# Patient Record
Sex: FEMALE | Race: WHITE
[De-identification: names, ages, dates, MRNs, and addresses within clinical notes are randomized per-mention and may not be internally consistent; named-entity substitution may affect disease eponyms.]

---

## 2017-04-15 ENCOUNTER — HOSPITAL ENCOUNTER (EMERGENCY)
Dept: HOSPITAL 25 - UCCORT | Age: 22
Discharge: HOME | End: 2017-04-15
Payer: COMMERCIAL

## 2017-04-15 VITALS — SYSTOLIC BLOOD PRESSURE: 105 MMHG | DIASTOLIC BLOOD PRESSURE: 68 MMHG

## 2017-04-15 DIAGNOSIS — S63.601A: Primary | ICD-10-CM

## 2017-04-15 DIAGNOSIS — Z32.02: ICD-10-CM

## 2017-04-15 DIAGNOSIS — Z87.891: ICD-10-CM
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
## 2017-04-15 PROCEDURE — G0463 HOSPITAL OUTPT CLINIC VISIT: HCPCS

## 2017-04-15 PROCEDURE — 99213 OFFICE O/P EST LOW 20 MIN: CPT

## 2017-04-15 PROCEDURE — 84702 CHORIONIC GONADOTROPIN TEST: CPT

## 2017-04-15 NOTE — RAD
HISTORY: Right thumb pain, trauma



COMPARISONS: None



VIEWS: 3, Frontal, lateral, and oblique views of the first digit of the right hand



FINDINGS:



BONE DENSITY: Normal.

BONES: There is no displaced fracture.    

JOINTS: There is no arthropathy.    

ALIGNMENT: There is no dislocation. 

SOFT TISSUES: Unremarkable.



OTHER FINDINGS: None.



IMPRESSION: 

NO ACUTE OSSEOUS INJURY. IF SYMPTOMS PERSIST, RECOMMEND REPEAT IMAGING.

## 2017-04-15 NOTE — UC
Hand/Wrist HPI





- HPI Summary


HPI Summary: 





right thumb pain x 1 day


s/p fall yesterday , jammed her right thumb 


pain and swelling of the right thumb 


difficulty moving her right thumb 





- History Of Current Complaint


Chief Complaint: UCUpperExtremity


Stated Complaint: RIGHT THUMB PAIN


Time Seen by Provider: 04/15/17 09:17


Hx Obtained From: Patient


Hx Last Menstrual Period: "I don't know 'cause I went from the Depo to the arm 

thing..."


Pregnant?: No


Onset/Duration: Sudden Onset, Lasting Days - 1, Still Present


Severity Initially: Moderate


Severity Currently: Moderate


Character Of Pain: Aching, Throbbing


Aggravating Factor(s): Movement, Flexion, Extension


Alleviating: Rest, Ice


Associated Signs And Symptoms: Positive: Swelling, Weakness.  Negative: Numbness

/Tingling





- Allergies/Home Medications


Allergies/Adverse Reactions: 


 Allergies











Allergy/AdvReac Type Severity Reaction Status Date / Time


 


Whit Grape Juice Allergy  Swelling Uncoded 04/15/17 09:11











Home Medications: 


 Home Medications





NK [No Home Medications Reported]  04/15/17 [History Confirmed 04/15/17]











PMH/Surg Hx/FS Hx/Imm Hx


Endocrine History Of: 


   Denies: Diabetes


Cardiovascular History Of: 


   Denies: Cardiac Disorders


Respiratory History Of: 


   Denies: Asthma





- Surgical History


Surgical History: None





- Family History


Known Family History: Positive: Hypertension





- Social History


Alcohol Use: Weekly


Substance Use Type: None


Smoking Status (MU): Light Every Day Tobacco Smoker


Type: Cigarettes


Amount Used/How Often: 1/3 PPD


Length of Time of Smoking/Using Tobacco: Since age 15


Have You Smoked in the Last Year: Yes


When Did the Patient Quit Smoking/Using Tobacco: quit 3 months ago


Household Exposure Type: Cigarettes





- Immunization History


Most Recent Influenza Vaccination: Not the 2016/2017 Season


Most Recent Tetanus Shot: 158LBS





Review of Systems


Constitutional: Negative


Skin: Negative


Eyes: Negative


ENT: Negative


Musculoskeletal: Other: - right thumb pain


All Other Systems Reviewed And Are Negative: Yes





Physical Exam


Triage Information Reviewed: Yes


Appearance: Well-Appearing, No Pain Distress, Well-Nourished


Vital Signs: 


 Initial Vital Signs











Temp  98.6 F   04/15/17 09:09


 


Pulse  94   04/15/17 09:09


 


Resp  16   04/15/17 09:09


 


BP  105/68   04/15/17 09:09


 


Pulse Ox  99   04/15/17 09:09











Vital Signs Reviewed: Yes


Eye Exam: Normal


Eyes: Positive: Conjunctiva Clear


ENT: Positive: Normal ENT inspection, Hearing grossly normal, Pharynx normal


Neck exam: Normal


Neck: Positive: Supple, Nontender, No Lymphadenopathy


Respiratory: Positive: Chest non-tender, Lungs clear, Normal breath sounds


Cardiovascular: Positive: RRR, No Murmur, Pulses Normal


Musculoskeletal: Positive: Other: - right thumb : + swelling MP joint, 

tenderness , limited ROM on flexion from MP joint





Diagnostics





- Laboratory


Diagnostic Studies Completed/Ordered: right thumb xray : no fracture seen





Hand/Wrist Course/Dx





- Differential Dx/Diagnosis


Provider Diagnoses: sprain right thumb





Discharge





- Discharge Plan


Condition: Stable


Disposition: HOME


Patient Education Materials:  Finger Sprain (ED)


Referrals: 


CHEMA Victor [Primary Care Provider] - 7 Days


Additional Instructions: 


no fracture seen on the xray 


sprain finger 


cont. with ice, rest, ibuprofen as needed for pain , use thumb spica 


follow up with your pcp in one week

## 2017-05-12 ENCOUNTER — HOSPITAL ENCOUNTER (EMERGENCY)
Dept: HOSPITAL 25 - UCCORT | Age: 22
Discharge: HOME | End: 2017-05-12
Payer: COMMERCIAL

## 2017-05-12 VITALS — DIASTOLIC BLOOD PRESSURE: 71 MMHG | SYSTOLIC BLOOD PRESSURE: 112 MMHG

## 2017-05-12 DIAGNOSIS — J02.9: Primary | ICD-10-CM

## 2017-05-12 DIAGNOSIS — R09.81: ICD-10-CM

## 2017-05-12 PROCEDURE — 87651 STREP A DNA AMP PROBE: CPT

## 2017-05-12 PROCEDURE — 99211 OFF/OP EST MAY X REQ PHY/QHP: CPT

## 2017-05-12 PROCEDURE — G0463 HOSPITAL OUTPT CLINIC VISIT: HCPCS

## 2017-05-12 NOTE — UC
Throat Pain/Nasal Mt HPI





- HPI Summary


HPI Summary: 





sore throat x 1 day ,


+ nasal congestion , cough, no fever, no chills, 


+ achy and fatigue 








- History of Current Complaint


Chief Complaint: UCRespiratory


Stated Complaint: THROAT,HEAD CONGESTION


Time Seen by Provider: 05/12/17 09:08


Hx Obtained From: Patient


Hx Last Menstrual Period: pt has nexplanon for B/C and states not getting a 

menses


Onset/Duration: Gradual Onset, Lasting Days - 1, Still Present


Severity: Severe


Cough: Nonproductive


Associated Signs & Symptoms: Positive: Nasal Discharge.  Negative: Sinus 

Discomfort, Fever, Rash





- Allergies/Home Medications


Allergies/Adverse Reactions: 


 Allergies











Allergy/AdvReac Type Severity Reaction Status Date / Time


 


Whit Grape Juice Allergy  Swelling Uncoded 05/12/17 08:59











Home Medications: 


 Home Medications





Etonogestrel [Nexplanon] 68 mg IMPLANT DAILY 05/12/17 [History Confirmed 05/12/ 17]











PMH/Surg Hx/FS Hx/Imm Hx


Endocrine History Of: 


   Denies: Diabetes


Cardiovascular History Of: 


   Denies: Cardiac Disorders


Respiratory History Of: 


   Denies: Asthma





- Surgical History


Surgical History: None





- Family History


Known Family History: Positive: Hypertension





- Social History


Alcohol Use: Weekly


Substance Use Type: None


Smoking Status (MU): Heavy Every Day Tobacco Smoker


Type: Cigarettes


Amount Used/How Often: 1/2m ppd


Length of Time of Smoking/Using Tobacco: Since age 15


Have You Smoked in the Last Year: Yes


When Did the Patient Quit Smoking/Using Tobacco: quit 3 months ago


Household Exposure Type: Cigarettes





- Immunization History


Most Recent Influenza Vaccination: Not the 2016/2017 Season


Most Recent Tetanus Shot: 158LBS





Review of Systems


Constitutional: Negative


Skin: Negative


Eyes: Negative


ENT: Sore Throat, Nasal Discharge


Respiratory: Cough


Cardiovascular: Negative


Gastrointestinal: Negative


Genitourinary: Negative


All Other Systems Reviewed And Are Negative: Yes





Physical Exam


Triage Information Reviewed: Yes


Appearance: Well-Appearing, No Pain Distress, Well-Nourished


Vital Signs: 


 Initial Vital Signs











Temp  98.2 F   05/12/17 08:50


 


Pulse  81   05/12/17 08:50


 


Resp  14   05/12/17 08:50


 


BP  112/71   05/12/17 08:50


 


Pulse Ox  99   05/12/17 08:50











Eye Exam: Normal


Eyes: Positive: Conjunctiva Clear


ENT: Positive: Normal ENT inspection, Hearing grossly normal, Pharyngeal 

erythema, Nasal congestion, Nasal drainage, TMs normal


Neck exam: Normal


Neck: Positive: Supple, Nontender, No Lymphadenopathy


Respiratory: Positive: Chest non-tender, Lungs clear, Normal breath sounds, No 

respiratory distress


Cardiovascular: Positive: RRR, No Murmur, Pulses Normal


Abdominal Exam: Normal





Throat Pain/Nasal Course/Dx





- Differential Dx/Diagnosis


Provider Diagnoses: pharyngitis





Discharge





- Discharge Plan


Condition: Stable


Disposition: HOME


Patient Education Materials:  Pharyngitis (ED)


Forms:  *Work Release


Referrals: 


CHEMA Victor [Primary Care Provider] - If Needed

## 2017-05-18 ENCOUNTER — HOSPITAL ENCOUNTER (EMERGENCY)
Dept: HOSPITAL 25 - UCCORT | Age: 22
Discharge: HOME | End: 2017-05-18
Payer: COMMERCIAL

## 2017-05-18 VITALS — SYSTOLIC BLOOD PRESSURE: 112 MMHG | DIASTOLIC BLOOD PRESSURE: 69 MMHG

## 2017-05-18 DIAGNOSIS — J32.9: Primary | ICD-10-CM

## 2017-05-18 DIAGNOSIS — Z87.891: ICD-10-CM

## 2017-05-18 PROCEDURE — 99211 OFF/OP EST MAY X REQ PHY/QHP: CPT

## 2017-05-18 PROCEDURE — G0463 HOSPITAL OUTPT CLINIC VISIT: HCPCS

## 2017-05-18 NOTE — UC
Throat Pain/Nasal Mt HPI





- HPI Summary


HPI Summary: 





NINE DAYS OF SINUS PRESSURE CONGESTION HEADACHE AND SORE THROAT. SEEN ON 5/11/ 17 DIAGNOSED WITH URI. NO FEVER. NO RASH NO ABDOMINAL PAIN





- History of Current Complaint


Chief Complaint: UCGeneralIllness


Stated Complaint: SINUS INFECTION


Time Seen by Provider: 05/18/17 20:53


Hx Obtained From: Patient


Hx Last Menstrual Period: nexplanon


Onset/Duration: Gradual Onset, Lasting Weeks, Still Present


Severity: Moderate


Pain Intensity: 7


Pain Scale Used: 0-10 Numeric


Cough: Nonproductive


Associated Signs & Symptoms: Positive: Hoarseness, Sinus Discomfort, Nasal 

Discharge





- Epiglottits Risk Factors


Epiglottis Risk Factors: Negative





- Allergies/Home Medications


Allergies/Adverse Reactions: 


 Allergies











Allergy/AdvReac Type Severity Reaction Status Date / Time


 


Whit Grape Juice Allergy  Swelling Uncoded 05/18/17 20:47














PMH/Surg Hx/FS Hx/Imm Hx


Previously Healthy: Yes


Endocrine History Of: 


   Denies: Diabetes


Cardiovascular History Of: 


   Denies: Cardiac Disorders


Respiratory History Of: 


   Denies: Asthma





- Surgical History


Surgical History: None





- Family History


Known Family History: Positive: Hypertension





- Social History


Occupation: Employed Full-time


Lives: With Family


Alcohol Use: Occasionally


Substance Use Type: None


Smoking Status (MU): Light Every Day Tobacco Smoker


Type: Cigarettes


Amount Used/How Often: 1/3 PPD


Length of Time of Smoking/Using Tobacco: Since age 15


Have You Smoked in the Last Year: Yes


When Did the Patient Quit Smoking/Using Tobacco: quit 3 months ago


Household Exposure Type: Cigarettes





- Immunization History


Most Recent Influenza Vaccination: Not the 2016/2017 Season


Most Recent Tetanus Shot: 158LBS





Review of Systems


Constitutional: Negative


Skin: Negative


Eyes: Negative


ENT: Sore Throat, Ear Ache, Nasal Discharge


Respiratory: Cough


Cardiovascular: Negative


Gastrointestinal: Negative


Genitourinary: Negative


Motor: Negative


Neurovascular: Negative


Musculoskeletal: Negative


Neurological: Negative


Psychological: Negative


All Other Systems Reviewed And Are Negative: Yes





Physical Exam


Triage Information Reviewed: Yes


Appearance: Well-Appearing, No Pain Distress, Well-Nourished


Vital Signs: 


 Initial Vital Signs











Temp  98.5 F   05/18/17 20:43


 


Pulse  102   05/18/17 20:43


 


Resp  16   05/18/17 20:43


 


BP  112/69   05/18/17 20:43


 


Pulse Ox  97   05/18/17 20:43











Vital Signs Reviewed: Yes


Eye Exam: Normal


ENT: Positive: Hearing grossly normal, Nasal congestion, TM bulging, TM dull


Dental Exam: Normal


Neck exam: Normal


Neck: Positive: Supple, Nontender, No Lymphadenopathy


Respiratory Exam: Normal


Respiratory: Positive: Chest non-tender, Lungs clear, Normal breath sounds


Cardiovascular Exam: Normal


Cardiovascular: Positive: RRR, No Murmur, Pulses Normal


Abdominal Exam: Normal


Abdomen Description: Positive: Nontender, No Organomegaly


Musculoskeletal Exam: Normal


Neurological Exam: Normal


Psychological Exam: Normal


Skin Exam: Normal





Throat Pain/Nasal Course/Dx





- Differential Dx/Diagnosis


Differential Diagnosis/HQI/PQRI: Pharyngitis, Sinusitis, URI


Provider Diagnoses: SINUSITIS





Discharge





- Discharge Plan


Condition: Stable


Disposition: HOME


Prescriptions: 


Amoxicillin/Clavulanate TAB* [Augmentin *] 875 mg PO BID #20 tab


Patient Education Materials:  Sinusitis (ED)


Referrals: 


CHEMA Victor [Primary Care Provider] -

## 2017-08-15 ENCOUNTER — HOSPITAL ENCOUNTER (EMERGENCY)
Dept: HOSPITAL 25 - UCCORT | Age: 22
Discharge: HOME | End: 2017-08-15
Payer: COMMERCIAL

## 2017-08-15 VITALS — SYSTOLIC BLOOD PRESSURE: 108 MMHG | DIASTOLIC BLOOD PRESSURE: 69 MMHG

## 2017-08-15 DIAGNOSIS — Z11.4: ICD-10-CM

## 2017-08-15 DIAGNOSIS — F17.200: ICD-10-CM

## 2017-08-15 DIAGNOSIS — N76.0: Primary | ICD-10-CM

## 2017-08-15 PROCEDURE — 87491 CHLMYD TRACH DNA AMP PROBE: CPT

## 2017-08-15 PROCEDURE — 87086 URINE CULTURE/COLONY COUNT: CPT

## 2017-08-15 PROCEDURE — 87591 N.GONORRHOEAE DNA AMP PROB: CPT

## 2017-08-15 PROCEDURE — 86703 HIV-1/HIV-2 1 RESULT ANTBDY: CPT

## 2017-08-15 PROCEDURE — 87510 GARDNER VAG DNA DIR PROBE: CPT

## 2017-08-15 PROCEDURE — 36415 COLL VENOUS BLD VENIPUNCTURE: CPT

## 2017-08-15 PROCEDURE — 81003 URINALYSIS AUTO W/O SCOPE: CPT

## 2017-08-15 PROCEDURE — 87077 CULTURE AEROBIC IDENTIFY: CPT

## 2017-08-15 PROCEDURE — 99212 OFFICE O/P EST SF 10 MIN: CPT

## 2017-08-15 PROCEDURE — 87661 TRICHOMONAS VAGINALIS AMPLIF: CPT

## 2017-08-15 PROCEDURE — 87480 CANDIDA DNA DIR PROBE: CPT

## 2017-08-15 PROCEDURE — 86592 SYPHILIS TEST NON-TREP QUAL: CPT

## 2017-08-15 PROCEDURE — G0463 HOSPITAL OUTPT CLINIC VISIT: HCPCS

## 2017-08-15 PROCEDURE — 84702 CHORIONIC GONADOTROPIN TEST: CPT

## 2017-08-15 NOTE — UC
Complaint Female HPI





- HPI Summary


HPI Summary: 





20 y/o female  presents to the urgent care c/o vaginal discharge w/ vaginal 

itching since 2017.  Pt thinks she has a yeast infection. she took 

monistat  3 days ago and it didn't help.   Pt cant recall her LMP since she has 

been in Nexplanon, she has a yeast infection in 2016, and Rx Diflucan which 

helped. Pt denies Hx of STD's , but would like to screen for all STD's.  Pt 

denies urinary  symptoms, fever, SOB, chest pain, N/V/D. pelvic or abdominal 

pain.











- History Of Current Complaint


Chief Complaint: UCGU


Stated Complaint: PERSONAL


Time Seen by Provider: 08/15/17 12:11


Hx Obtained From: Patient


Hx Last Menstrual Period: "I have no clue." [Nexplanon]


Onset/Duration: Gradual Onset, Lasting Days, Still Present


Timing: Constant, Lasting Days


Severity Initially: Mild


Severity Currently: Moderate


Pain Intensity: 0


Pain Scale Used: 0-10 Numeric


Character: Not Applicable


Aggravating Factor(s): Nothing


Alleviating Factor(s): Nothing


Associated Signs And Symptoms: Positive: Vaginal Discharge.  Negative: Fever, 

Back Pain, Nausea, Vomiting(# Of Episodes =), Genital Swelling, Genital Blisters





- Risk Factors


Ectopic Pregnancy Risk Factor: Negative


Ovarian Torsion Risk Factor: Negative





- Allergies/Home Medications


Allergies/Adverse Reactions: 


 Allergies











Allergy/AdvReac Type Severity Reaction Status Date / Time


 


White Grape Juice Allergy  Swelling Uncoded 08/15/17 11:54














PMH/Surg Hx/FS Hx/Imm Hx


Previously Healthy: Yes





- Surgical History


Surgical History: None





- Family History


Known Family History: Positive: Hypertension





- Social History


Occupation: Employed Full-time


Lives: With Family


Alcohol Use: Weekly


Substance Use Type: None


Smoking Status (MU): Heavy Every Day Tobacco Smoker


Type: Cigarettes


Amount Used/How Often: 1/2 PPD


Length of Time of Smoking/Using Tobacco: Since Age 14


Have You Smoked in the Last Year: Yes


When Did the Patient Quit Smoking/Using Tobacco: quit 3 months ago


Household Exposure Type: Cigarettes





- Immunization History


Most Recent Influenza Vaccination: Not the / Season


Most Recent Tetanus Shot: 158LBS





Review of Systems


Constitutional: Negative


Skin: Negative


Eyes: Negative


ENT: Negative


Respiratory: Negative


Cardiovascular: Negative


Gastrointestinal: Other - vaginal discharge with itchiness


Genitourinary: Negative


Motor: Negative


Neurovascular: Negative


Musculoskeletal: Negative


Neurological: Negative


Psychological: Negative


All Other Systems Reviewed And Are Negative: Yes





Physical Exam


Triage Information Reviewed: Yes


Appearance: Well-Appearing, No Pain Distress, Well-Nourished


Vital Signs: 


 Initial Vital Signs











Temp  98.6 F   08/15/17 11:51


 


Pulse  102   08/15/17 11:51


 


Resp  16   08/15/17 11:51


 


BP  108/69   08/15/17 11:51


 


Pulse Ox  98   08/15/17 11:51











Vital Signs Reviewed: Yes


Eye Exam: Normal


Eyes: Positive: Conjunctiva Clear - PERRLA, EOMI,


ENT Exam: Normal


ENT: Positive: Normal ENT inspection, Hearing grossly normal, Pharynx normal, 

TMs normal


Dental Exam: Normal


Neck exam: Normal


Neck: Positive: Supple, Nontender, No Lymphadenopathy


Respiratory Exam: Normal


Respiratory: Positive: Chest non-tender, Lungs clear, Normal breath sounds


Cardiovascular Exam: Normal


Cardiovascular: Positive: RRR, No Murmur, Pulses Normal


Abdominal Exam: Normal


Abdomen Description: Positive: Nontender, No Organomegaly, Soft, Other: -   

Pelvic:  I was assisted by Nurse Rosina. External genitalia within normal 

limits.  There is no lesions there is no masses noted.  Speculum exam: The 

vaginal walls are within normal limits w/ white, greyish cottage cheese vaginal 

discharge, no the lesions or rashes.  The cervix is closed with mild erythema 

around.  There is no CMT's, and no adnexal masses.  Sample sent to  Lab for  G/

C and affirm. Rectal: No lesions, no hemorrhoids,.  Negative: CVA Tenderness (R)

, CVA Tenderness (L)


Bowel Sounds: Positive: Present


Musculoskeletal Exam: Normal


Musculoskeletal: Positive: Strength Intact, ROM Intact, No Edema


Neurological Exam: Normal


Psychological Exam: Normal


Skin Exam: Normal





 Complaint Female Dx





- Course


Course Of Treatment: 20 y/o female  presents to the urgent care c/o vaginal 

discharge w/ vaginal itching since 2017.  Pt thinks she has a yeast 

infection. she took monistat  3 days ago and it didn't help.   Pt cant recall 

her LMP since she has been in Nexplanon, she has a yeast infection in 2016, and 

Rx Diflucan which helped. Pt denies Hx of STD's , but would like to screen for 

all STD's.  Pt denies urinary  symptoms, fever, SOB, chest pain, N/V/D. pelvic 

or abdominal pain.HX obtained.  Labs ordered to screen for all STD's: HIV, RPR, 

GC/Germaine, Affirm, UA: + trace of Leukoescteraces, will be sent for culture r/o 

UTI. Pregnanacy test: negative. Pt Rx Fluconazol PO and Metronidazol vaginal 

cream to Tx Vulvovaginal Candidiasis and BV.  Pt stronly advised to f/u with 

GYN for a PAP since there is erythema aroud the cervical os. Pt also advised if 

anything abnormal from lab result she will hear from us for further treatment. 

Pt understood and agreed





- Differential Dx/Diagnosis


Differential Diagnosis/HQI/PQRI: Cervicitis, Pelvic Inflammatory Disease, 

Pregnancy, Sexually Transmitted Disease, Urinary Tract Infection, Other - 

vaginosis


Provider Diagnoses: 1- Vulvovaginal candidiasis.  2- Bacterail Vaginosis.  3 

HIV screening.  4- sexually transmitted disease screening





Discharge





- Discharge Plan


Condition: Stable


Disposition: HOME


Prescriptions: 


Fluconazole [Fluconazole 150 mg tab] 150 mg PO ONCE #1 tab


metroNIDAZOLE VAGINAL 0.75%* 1 applic VAGINAL BEDTIME #1 yulia


Patient Education Materials:  Vulvovaginal Candidiasis (ED), Bacterial 

Vaginosis (ED)


Referrals: 


CHEMA Victor [Primary Care Provider] - If Needed


Additional Instructions: 


1- Please take and apply medications as directed.


2-Specimen sent to lab for STD's screening, if anything abnormal you will hear 

from us for further treatment


3-F/u with your GYN if not improvement of symptoms or your next PAP

## 2017-08-16 LAB — SYPHILIS INDEX: < 0.1 INDEX

## 2017-08-17 NOTE — UC
Progress





- Progress Note


Progress Note: 





Vag DNA shows +BV, pt treated with metronidazole.  Candida neg, was given 

fluconazole x 1.  Urine culture shows 1-10,000 Strep Grp B, will treat.  Needs 

Amoxicillin 875mg po bid #14.  E-Rx'd.  allergies noted.  Pregnancy test neg on 

date examined.  GENE Lopez MD, 8/17/17, 13:54.

## 2017-09-11 ENCOUNTER — HOSPITAL ENCOUNTER (EMERGENCY)
Dept: HOSPITAL 25 - UCCORT | Age: 22
Discharge: HOME | End: 2017-09-11
Payer: COMMERCIAL

## 2017-09-11 VITALS — DIASTOLIC BLOOD PRESSURE: 63 MMHG | SYSTOLIC BLOOD PRESSURE: 113 MMHG

## 2017-09-11 DIAGNOSIS — J03.90: Primary | ICD-10-CM

## 2017-09-11 DIAGNOSIS — Z72.0: ICD-10-CM

## 2017-09-11 PROCEDURE — 87651 STREP A DNA AMP PROBE: CPT

## 2017-09-11 PROCEDURE — 99211 OFF/OP EST MAY X REQ PHY/QHP: CPT

## 2017-09-11 PROCEDURE — G0463 HOSPITAL OUTPT CLINIC VISIT: HCPCS

## 2017-09-11 NOTE — UC
Throat Pain/Nasal Mt HPI





- HPI Summary


HPI Summary: 





SORE THROAT SINCE YESTERDAY, EAR FULLNESS. NO FEVER. NO RASH. NO ABDOMINAL 

PAIN. 





- History of Current Complaint


Chief Complaint: UCRespiratory


Stated Complaint: SORE THROAT


Time Seen by Provider: 09/11/17 16:37


Hx Obtained From: Patient


Hx Last Menstrual Period: pt has implanon and states not getting a menses


Onset/Duration: Gradual Onset


Severity: Moderate


Pain Intensity: 6


Pain Scale Used: 0-10 Numeric


Cough: None


Associated Signs & Symptoms: Positive: Dysphagia, Hoarseness





- Epiglottits Risk Factors


Epiglottis Risk Factors: Negative





- Allergies/Home Medications


Allergies/Adverse Reactions: 


 Allergies











Allergy/AdvReac Type Severity Reaction Status Date / Time


 


White Grape Juice Allergy  Swelling Uncoded 08/15/17 11:54











Home Medications: 


 Home Medications





Antihistimine 1 tab PO PRN 09/11/17 [History]


Ibuprofen [Ibuprofen 200 MG] 400 mg PO PRN 09/11/17 [History]











PMH/Surg Hx/FS Hx/Imm Hx


Previously Healthy: Yes





- Surgical History


Surgical History: None





- Family History


Known Family History: Positive: Hypertension





- Social History


Occupation: Employed Full-time


Lives: With Family


Alcohol Use: Weekly


Substance Use Type: None


Smoking Status (MU): Heavy Every Day Tobacco Smoker


Type: Cigarettes


Amount Used/How Often: 1/2 PPD


Length of Time of Smoking/Using Tobacco: Since Age 14


Have You Smoked in the Last Year: Yes


When Did the Patient Quit Smoking/Using Tobacco: quit 3 months ago


Household Exposure Type: Cigarettes


Cessation Counseling: Patient Advised to Stop





- Immunization History


Most Recent Influenza Vaccination: Not the 2016/2017 Season


Most Recent Tetanus Shot: 158LBS





Review of Systems


Constitutional: Negative


Skin: Negative


Eyes: Negative


ENT: Sore Throat


Respiratory: Negative


Cardiovascular: Negative


Gastrointestinal: Negative


Genitourinary: Negative


Motor: Negative


Neurovascular: Negative


Musculoskeletal: Negative


Neurological: Negative


Psychological: Negative


Is Patient Immunocompromised?: No


All Other Systems Reviewed And Are Negative: Yes





Physical Exam


Triage Information Reviewed: Yes


Appearance: No Pain Distress, Well-Nourished, Ill-Appearing - MILDLY


Vital Signs: 


 Initial Vital Signs











Temp  98.9 F   09/11/17 16:39


 


Pulse  89   09/11/17 16:39


 


Resp  16   09/11/17 16:39


 


BP  113/63   09/11/17 16:39


 


Pulse Ox  99   09/11/17 16:39











Vital Signs Reviewed: Yes


Eye Exam: Normal


ENT: Positive: Hearing grossly normal, Pharyngeal erythema, TMs normal, 

Tonsillar swelling


Dental Exam: Normal


Neck exam: Normal


Neck: Positive: Supple, Nontender, No Lymphadenopathy


Respiratory Exam: Normal


Respiratory: Positive: Chest non-tender, Lungs clear, Normal breath sounds, No 

respiratory distress


Cardiovascular Exam: Normal


Cardiovascular: Positive: RRR, No Murmur, Pulses Normal


Abdominal Exam: Normal


Abdomen Description: Positive: Nontender, No Organomegaly


Musculoskeletal Exam: Normal


Musculoskeletal: Positive: Strength Intact, ROM Intact


Neurological Exam: Normal


Psychological Exam: Normal


Skin Exam: Normal





Throat Pain/Nasal Course/Dx





- Differential Dx/Diagnosis


Differential Diagnosis/HQI/PQRI: Pharyngitis, Sinusitis, Tonsillitis


Provider Diagnoses: TONSILLITIS





Discharge





- Discharge Plan


Condition: Stable


Disposition: HOME


Patient Education Materials:  Tonsillitis (ED)


Forms:  *Work Release


Referrals: 


CHEMA Victor [Primary Care Provider] -

## 2017-12-03 ENCOUNTER — HOSPITAL ENCOUNTER (EMERGENCY)
Dept: HOSPITAL 25 - UCCORT | Age: 22
Discharge: HOME | End: 2017-12-03
Payer: COMMERCIAL

## 2017-12-03 VITALS — DIASTOLIC BLOOD PRESSURE: 69 MMHG | SYSTOLIC BLOOD PRESSURE: 130 MMHG

## 2017-12-03 DIAGNOSIS — N76.0: Primary | ICD-10-CM

## 2017-12-03 DIAGNOSIS — Z87.891: ICD-10-CM

## 2017-12-03 DIAGNOSIS — Z11.4: ICD-10-CM

## 2017-12-03 PROCEDURE — 86703 HIV-1/HIV-2 1 RESULT ANTBDY: CPT

## 2017-12-03 PROCEDURE — 99212 OFFICE O/P EST SF 10 MIN: CPT

## 2017-12-03 PROCEDURE — G0463 HOSPITAL OUTPT CLINIC VISIT: HCPCS

## 2017-12-03 PROCEDURE — 36415 COLL VENOUS BLD VENIPUNCTURE: CPT

## 2017-12-03 NOTE — UC
UC General HPI





- HPI Summary


HPI Summary: 





Patient has had white vaginal dishcarge, with an odor and some itching of the 

vulva for the past few days,





- History of Current Complaint


Chief Complaint: UCGU


Stated Complaint: PERSONAL


Time Seen by Provider: 12/03/17 16:50


Hx Obtained From: Patient


Hx Last Menstrual Period: pt has implanon and states not getting a menses


Onset/Duration: Sudden Onset, Lasting Days


Timing: Constant


Onset Severity: Mild


Current Severity: Mild





- Allergy/Home Medications


Allergies/Adverse Reactions: 


 Allergies











Allergy/AdvReac Type Severity Reaction Status Date / Time


 


White Grape Juice Allergy  Swelling Uncoded 12/03/17 16:34














PMH/Surg Hx/FS Hx/Imm Hx


Previously Healthy: Yes





- Surgical History


Surgical History: None





- Family History


Known Family History: Positive: Hypertension





- Social History


Alcohol Use: Weekly


Substance Use Type: None


Smoking Status (MU): Heavy Every Day Tobacco Smoker


Type: Cigarettes


Amount Used/How Often: 1/2 PPD


Length of Time of Smoking/Using Tobacco: Since Age 14


Have You Smoked in the Last Year: Yes


When Did the Patient Quit Smoking/Using Tobacco: quit 3 months ago


Household Exposure Type: Cigarettes





- Immunization History


Most Recent Influenza Vaccination: Not the 2016/2017 Season


Most Recent Tetanus Shot: 158LBS





Review of Systems


Constitutional: Negative


Skin: Negative


Eyes: Negative


ENT: Negative


Respiratory: Negative


Cardiovascular: Negative


Gastrointestinal: Negative


Genitourinary: Vaginal/Penile Itching, Vaginal/Penile Discharge


Motor: Negative


Neurovascular: Negative


Musculoskeletal: Negative


Neurological: Negative


Psychological: Negative


Is Patient Immunocompromised?: No


All Other Systems Reviewed And Are Negative: Yes





Physical Exam


Triage Information Reviewed: Yes


Appearance: Well-Appearing, Well-Nourished, Pain Distress


Vital Signs: 


 Initial Vital Signs











Temp  98.4 F   12/03/17 16:29


 


Pulse  95   12/03/17 16:29


 


Resp  17   12/03/17 16:29


 


BP  130/69   12/03/17 16:29


 


Pulse Ox  99   12/03/17 16:29











Vital Signs Reviewed: Yes


Eye Exam: Normal


ENT Exam: Normal


Dental Exam: Normal


Neck exam: Normal


Respiratory Exam: Normal


Cardiovascular Exam: Normal


Abdominal Exam: Normal


Abdomen Description: Positive: Nontender, No Organomegaly, Soft, Other: - 

vaginal discharge


Bowel Sounds: Positive: Present


Musculoskeletal Exam: Normal


Neurological Exam: Normal


Psychological Exam: Normal





Course/Dx





- Course


Course Of Treatment: hx obtained, exam performed ,med reviewed, treated for 

Yeast infection





- Differential Dx - Multi-Symptom


Provider Diagnoses: vaginitis





Discharge





- Discharge Plan


Condition: Stable


Disposition: HOME


Patient Education Materials:  Vaginitis (ED)


Referrals: 


CHEMA Victor [Primary Care Provider] - 


Additional Instructions: 


1. take the medication as prescribed.


2. use the coconut oil for soothing the skin


3. follow up as needed.

## 2018-01-23 ENCOUNTER — HOSPITAL ENCOUNTER (EMERGENCY)
Dept: HOSPITAL 25 - UCCORT | Age: 23
Discharge: HOME | End: 2018-01-23
Payer: COMMERCIAL

## 2018-01-23 VITALS — DIASTOLIC BLOOD PRESSURE: 68 MMHG | SYSTOLIC BLOOD PRESSURE: 115 MMHG

## 2018-01-23 DIAGNOSIS — R50.9: ICD-10-CM

## 2018-01-23 DIAGNOSIS — R51: ICD-10-CM

## 2018-01-23 DIAGNOSIS — J02.9: Primary | ICD-10-CM

## 2018-01-23 DIAGNOSIS — M79.1: ICD-10-CM

## 2018-01-23 DIAGNOSIS — R09.81: ICD-10-CM

## 2018-01-23 DIAGNOSIS — Z72.0: ICD-10-CM

## 2018-01-23 PROCEDURE — 87651 STREP A DNA AMP PROBE: CPT

## 2018-01-23 PROCEDURE — 87502 INFLUENZA DNA AMP PROBE: CPT

## 2018-01-23 PROCEDURE — G0463 HOSPITAL OUTPT CLINIC VISIT: HCPCS

## 2018-01-23 PROCEDURE — 99211 OFF/OP EST MAY X REQ PHY/QHP: CPT

## 2018-01-23 NOTE — UC
Throat Pain/Nasal Mt HPI





- HPI Summary


HPI Summary: 





23 y/o female presents to the urgent care c/o sore throat, fever, Ha nasal 

congestion and body aches for the past 2 days. Her friend was Dx with 

bronchitis recently. Pt has taking Tylenol PO to alleviate symptoms. Pain is 5/

10 with swallowing. Pt denies SOB, wheezing, chest pain, abdominal pain, N/V/D. 


 





- History of Current Complaint


Chief Complaint: UCRespiratory


Stated Complaint: SORE THROAT/FEVER


Time Seen by Provider: 01/23/18 17:25


Hx Obtained From: Patient


Hx Last Menstrual Period: unknown, nexplanon


Pregnant?: No


Onset/Duration: Gradual Onset, Lasting Days - 2 days, Still Present


Severity: Moderate


Pain Intensity: 5


Pain Scale Used: 0-10 Numeric


Cough: Nonproductive


Associated Signs & Symptoms: Positive: Nasal Discharge, Fever, Other - body 

aches





- Epiglottits Risk Factors


Epiglottis Risk Factors: Negative





- Allergies/Home Medications


Allergies/Adverse Reactions: 


 Allergies











Allergy/AdvReac Type Severity Reaction Status Date / Time


 


White Grape Juice Allergy  Swelling Uncoded 01/23/18 17:26











Home Medications: 


 Home Medications





Acetaminophen TAB* [Tylenol TAB*] 650 mg PO Q4H PRN 01/23/18 [History Confirmed 

01/23/18]











PMH/Surg Hx/FS Hx/Imm Hx


Previously Healthy: Yes - Pt deneis PMHX 





- Surgical History


Surgical History: None





- Family History


Known Family History: Positive: Hypertension





- Social History


Occupation: Employed Full-time


Lives: With Family


Alcohol Use: Weekly


Substance Use Type: None


Smoking Status (MU): Heavy Every Day Tobacco Smoker


Type: Cigarettes


Amount Used/How Often: 1/2 PPD


Length of Time of Smoking/Using Tobacco: Since Age 14


Have You Smoked in the Last Year: Yes


When Did the Patient Quit Smoking/Using Tobacco: quit 3 months ago


Household Exposure Type: Cigarettes





- Immunization History


Most Recent Influenza Vaccination: Not the 2016/2017 Season


Most Recent Tetanus Shot: 158LBS





Review of Systems


Constitutional: Fever, Other - body aches


Skin: Negative


Eyes: Negative


ENT: Sore Throat, Nasal Discharge, Sinus Congestion


Respiratory: Cough - dry


Cardiovascular: Negative


Gastrointestinal: Negative


Genitourinary: Negative


Motor: Negative


Neurovascular: Negative


Musculoskeletal: Negative


Neurological: Headache


Psychological: Negative


Is Patient Immunocompromised?: No


All Other Systems Reviewed And Are Negative: Yes





Physical Exam


Triage Information Reviewed: Yes


Vital Signs: 


 Initial Vital Signs











Temp  99 F   01/23/18 17:22


 


Pulse  88   01/23/18 17:22


 


Resp  16   01/23/18 17:22


 


BP  115/68   01/23/18 17:22


 


Pulse Ox  100   01/23/18 17:22














- Additional Comments





VITAL SIGNS: Reviewed. 


GENERAL: Patient is a well developed and nourished female who is sitting 

comfortable in the examining table. Patient is not in any acute respiratory 

distress. 


HEAD AND FACE: No signs of trauma. No ecchymosis, hematomas or skull 

depressions. No sinus tenderness. 


EYES: PERRLA, EOMI x 2, No injected conjunctiva, no nystagmus. No photophobia.


EARS: Hearing grossly intact. Ear canals and tympanic membranes are within 

normal limits. 


MOUTH: Positive pharynx with erythema, no exudates, palatal petechiae. No  B/L 

tonsillar enlargement . . Uvula in midline. 


NECK: Supple, trachea is midline, Positive anterior cervical lymphadenopathy, 

no JVD, no carotid bruit, no c-spine tenderness, neck with full ROM. No 

meningeal signs, no Kernig's or brudzinskis signs. 


CHEST: Symmetric, no tenderness at palpation 


LUNGS: Clear to auscultation bilaterally. No wheezing or crackles.


CVS: Regular rate and rhythm, S1 and S2 present, no murmurs or gallops 

appreciated. 


ABDOMEN: Soft, non-tender. No signs of distention. No rebound no guarding, and 

no masses palpated. Bowel sounds are normal. 


EXTREMITIES: FROM in all major joints, no edema, no cyanosis or clubbing.


NEURO: Alert and oriented x 3. No acute neurological deficits. Speech is normal 

and follows commands. 


SKIN: Dry and warm 











Throat Pain/Nasal Course/Dx





- Course


Course Of Treatment: 23 y/o female presents to the urgent care c/o sore throat, 

fever, Ha nasal congestion and body aches for the past 2 days. Her friend was 

Dx with bronchitis recently. Pt has taking Tylenol PO to alleviate symptoms. 

Pain is 5/10 with swallowing. Pt denies SOB, wheezing, chest pain, abdominal 

pain, N/V/D. Hx obtained. Pt with pharyngitis on examination. Rapid strep 

ordered, result: negative.Influenza A&B ordered: result: negative. Pt Rx 

ibuprofen PO to alleviates symptoms. Advised on hand washing. Pt advised to rest

, increase fluid intake, eat well and avoid strenuous exercise. If symptoms do 

not improve or worsen advised to return to the urgent care or f/u with her PCP 

for further evaluation and treatment. Pt understood and agreed with plan of 

care.





- Differential Dx/Diagnosis


Differential Diagnosis/HQI/PQRI: Influenza, Laryngitis, Mononucleosis, 

Pharyngitis, Sinusitis, Tonsillitis, URI


Provider Diagnoses: 1-Acute viral pharyngitis





Discharge





- Discharge Plan


Condition: Stable


Disposition: HOME


Prescriptions: 


Ibuprofen TAB* [Motrin TAB* 800 MG] 800 mg PO Q6H PRN #20 tab


 PRN Reason: Sore Throat


Patient Education Materials:  Pharyngitis (ED)


Forms:  *Work Release


Referrals: 


[facility] [place] - If Needed


Additional Instructions: 


1-Please take ibuprofen PO q6-8hrs prn as instructed after meals to alleviate 

pain and swelling. Increase fluid intake, eat well, rest and avoid strenuous 

exercise


2-If symptoms do not improve or worsen please return to the urgent care or f/u 

with your PCP for further evaluation and treatment.

## 2019-04-02 ENCOUNTER — HOSPITAL ENCOUNTER (EMERGENCY)
Dept: HOSPITAL 25 - UCCORT | Age: 24
Discharge: HOME | End: 2019-04-02
Payer: COMMERCIAL

## 2019-04-02 VITALS — SYSTOLIC BLOOD PRESSURE: 96 MMHG | DIASTOLIC BLOOD PRESSURE: 57 MMHG

## 2019-04-02 DIAGNOSIS — Z87.891: ICD-10-CM

## 2019-04-02 DIAGNOSIS — K21.9: ICD-10-CM

## 2019-04-02 DIAGNOSIS — H10.9: Primary | ICD-10-CM

## 2019-04-02 PROCEDURE — 99212 OFFICE O/P EST SF 10 MIN: CPT

## 2019-04-02 PROCEDURE — G0463 HOSPITAL OUTPT CLINIC VISIT: HCPCS

## 2019-04-02 NOTE — UC
General HPI





- HPI Summary


HPI Summary: 





R EYE IRRITATED AND RED WITH GREEN DISCHARGE SINCE YESTERDAY.


NO CONTACT USE OR VISUAL LOSS.


PEOPLE AT WORK ARE "ALL SICK".





- History of Current Complaint


Chief Complaint: UCEye


Stated Complaint: RT EYE CONCERN


Time Seen by Provider: 04/02/19 10:07


Hx Obtained From: Patient


Hx Last Menstrual Period: depo


Onset/Duration: Gradual Onset


Timing: Constant


Pain Intensity: 0


Associated Signs & Symptoms: Negative: Edema, Fever, Headache





- Allergy/Home Medications


Allergies/Adverse Reactions: 


 Allergies











Allergy/AdvReac Type Severity Reaction Status Date / Time


 


White Grape Juice Allergy  Swelling Uncoded 04/02/19 10:00











Home Medications: 


 Home Medications





Omeprazole 20 mg PO DAILY 04/02/19 [History Confirmed 04/02/19]


medroxyPROGESTERone ACETATE* [DEPO-Provera] 150 mg IM SEE INSTRUCTIONS 04/02/19 

[History Confirmed 04/02/19]











PMH/Surg Hx/FS Hx/Imm Hx


GI/ History: Gastroesophageal Reflux





- Surgical History


Surgical History: None





- Family History


Known Family History: Positive: Hypertension





- Social History


Occupation: Employed Full-time


Alcohol Use: Occasionally


Substance Use Type: None


Smoking Status (MU): Former Smoker


Type: Cigarettes


Amount Used/How Often: 1/2 PPD


Length of Time of Smoking/Using Tobacco: Since Age 14


Have You Smoked in the Last Year: Yes


When Did the Patient Quit Smoking/Using Tobacco: 6/2018


Household Exposure Type: Cigarettes





- Immunization History


Most Recent Influenza Vaccination: Not the 2016/2017 Season


Most Recent Tetanus Shot: 158LBS





Review of Systems


All Other Systems Reviewed And Are Negative: Yes


Eyes: Positive: Drainage, Eye Redness.  Negative: Blurred Vision, Diplopia, 

Photophobia





Physical Exam


Triage Information Reviewed: Yes


Appearance: Well-Appearing


Vital Signs: 


 Initial Vital Signs











Temp  98.1 F   04/02/19 10:01


 


Pulse  73   04/02/19 10:01


 


Resp  14   04/02/19 10:01


 


BP  96/57   04/02/19 10:01


 


Pulse Ox  100   04/02/19 10:01











Vital Signs Reviewed: Yes


Eyes: Positive: Other: -  no auricular adenopathy. no periorbital edema or 

rash. R conjunctiva injected with exudate, L is clear. PERRL, EOMI. AC's clear. 

No FB's.


ENT: Positive: Pharynx normal, TMs normal.  Negative: Nasal congestion


Neck: Positive: Supple, Nontender, No Lymphadenopathy


Respiratory: Positive: Lungs clear


Cardiovascular: Positive: RRR


Abdomen Description: Positive: Nontender


Bowel Sounds: Positive: Present


Musculoskeletal: Positive: ROM Intact


Neurological: Positive: Alert


Psychological: Positive: Age Appropriate Behavior


Skin Exam: Normal


Skin: Negative: Rashes





Course/Dx





- Diagnoses


Provider Diagnosis: 


 Conjunctivitis








Discharge





- Sign-Out/Discharge


Documenting (check all that apply): Patient Departure


All imaging exams completed and their final reports reviewed: No Studies





- Discharge Plan


Condition: Stable


Disposition: HOME


Prescriptions: 


Polymyx/Trimethoprim OPTH* [Polytrim OPHTH*] 1 drop BOTH EYES Q3H 7 Days #1 btl


Patient Education Materials:  Conjunctivitis (ED)


Forms:  *Work Release


Referrals: 


Eunice Simon [Primary Care Provider] - 7 Days


Additional Instructions: 


FOLLOW UP IF NOT BETTER IN 7 DAYS OR SOONER IF WORSE.





- Billing Disposition and Condition


Condition: STABLE


Disposition: Home

## 2019-04-22 ENCOUNTER — HOSPITAL ENCOUNTER (EMERGENCY)
Dept: HOSPITAL 25 - UCCORT | Age: 24
Discharge: HOME | End: 2019-04-22
Payer: COMMERCIAL

## 2019-04-22 VITALS — DIASTOLIC BLOOD PRESSURE: 63 MMHG | SYSTOLIC BLOOD PRESSURE: 110 MMHG

## 2019-04-22 DIAGNOSIS — Z91.018: ICD-10-CM

## 2019-04-22 DIAGNOSIS — R09.81: ICD-10-CM

## 2019-04-22 DIAGNOSIS — J03.90: Primary | ICD-10-CM

## 2019-04-22 DIAGNOSIS — Z87.891: ICD-10-CM

## 2019-04-22 PROCEDURE — G0463 HOSPITAL OUTPT CLINIC VISIT: HCPCS

## 2019-04-22 PROCEDURE — 99212 OFFICE O/P EST SF 10 MIN: CPT

## 2019-04-22 PROCEDURE — 87651 STREP A DNA AMP PROBE: CPT

## 2019-04-22 NOTE — UC
Throat Pain/Nasal Mt HPI





- HPI Summary


HPI Summary: 





Patient presents to urgent car reporting progressive sore throat for the last 2-

3 days.  Patient with a history of recurrent tonsillitis.  Patient states last 

time her strep wass negative and she progressed went to  emergency department 

being evaluated by Dr. Messina.  Patient states Dr. Messina instructed she should 

be put on antibiotics at this happens again in her tonsils with a taken out.  

Patient states painful swallowing.  Patient's been using Motrin for her fevers 

with a MAXIMUM TEMPERATURE of 102.  Patient has been swish and swallow with 

warm salt water.  No chest pain or shortness of breath.  No nausea vomiting.  

Patient states she is not pregnant medications reviewed this visit.  No sick 

contacts.





- History of Current Complaint


Chief Complaint: UCGeneralIllness


Stated Complaint: FEVER,SORE THROAT


Time Seen by Provider: 04/22/19 07:36


Hx Obtained From: Patient


Hx Last Menstrual Period: depo


Pregnant?: No


Onset/Duration: Gradual Onset


Severity: Severe


Pain Intensity: 8





- Allergies/Home Medications


Allergies/Adverse Reactions: 


 Allergies











Allergy/AdvReac Type Severity Reaction Status Date / Time


 


White Grape Juice Allergy  Swelling Uncoded 04/22/19 07:27











Home Medications: 


 Home Medications





D-Methorphan/PE/Acetaminophen [[place] Plus Day Col] 1 cap PO ONCE 04/22/ 19 [History Confirmed 04/22/19]


Ibuprofen TAB* [Advil TAB*] 400 mg PO Q6H PRN 04/22/19 [History Confirmed 04/22/ 19]











PMH/Surg Hx/FS Hx/Imm Hx


Previously Healthy: Yes





- Surgical History


Surgical History: None





- Family History


Known Family History: Positive: Hypertension





- Social History


Occupation: Student


Lives: With Family


Alcohol Use: Occasionally


Substance Use Type: None


Smoking Status (MU): Former Smoker


Type: Cigarettes


Amount Used/How Often: 1/2 PPD


Length of Time of Smoking/Using Tobacco: Since Age 14


Have You Smoked in the Last Year: Yes


When Did the Patient Quit Smoking/Using Tobacco: 6/2018


Household Exposure Type: Cigarettes





- Immunization History


Most Recent Influenza Vaccination: Not the 2016/2017 Season


Most Recent Tetanus Shot: 158LBS





Review of Systems


All Other Systems Reviewed And Are Negative: Yes


Constitutional: Positive: Fever


ENT: Positive: Sore Throat, Sinus Congestion


Respiratory: Positive: Negative


Cardiovascular: Positive: Negative





Physical Exam





- Summary


Physical Exam Summary: 





Vital Signs Reviewed: Yes


A+Ox3, no distress


Eyes: Conjunctiva Clear, NEY. EOM intact and full


ENT: Hearing grossly normal  TM x 2 clear, turbinates wnl, mmoist, uvula midline

, tonsils enlarged,  + erythema, + exudate L>R  no asymmetry


Neck: Positive: Supple


Respiratory: Positive: No respiratory distress, No accessory muscle use + CTA 

throughout  no w/r


Cardiovascular: RRR  nl s1, s2  no m/r  CBT <2  sec


abd soft + BS nt/nd  no guarding, no distension


Musculoskeletal Exam: ROB x 4 without difficulty Strength Intact, ROM Intact


Neurological: Positive: Alert,  + sensation throughout


Psychological: Positive: Normal Response To Family


Skin: Positive: no rash, no ecchymosis


Triage Information Reviewed: Yes


Vital Signs: 


 Initial Vital Signs











Temp  98.1 F   04/22/19 07:27


 


Pulse  90   04/22/19 07:27


 


Resp  18   04/22/19 07:27


 


BP  110/63   04/22/19 07:27


 


Pulse Ox  99   04/22/19 07:27














Throat Pain/Nasal Course/Dx





- Course


Course Of Treatment: 





Patient presents to urgent care with progressive sore throat and fevers last to 

3 days.  Patient with history of recurrent tonsillitis.  Patient's patient of 

Dr. Messina who indicated he would take her tonsils out of this happen again.  

Patient's vital signs reviewed.  Patient took Motrin this morning.  Patient 

noted to have large inflamed tonsils diffuse erythema and exudate left greater 

than right tonsil.  Patient's symptoms.  Rapid strep is negative.  Given patient

's history of tonsillitis and reported previous conversations with Dr. Messina, 

will scar patient a Akins as well as prednisone.  Recommend gargle spit warm 

salt water.  Secretion secretion precautions.  Work note.  Mom with Dr. Messina 

this week.  Patient states understanding agreement with plan.  She also given a 

prescription for Diflucan as she states she gets yeast infections following 

antibiotics.





- Differential Dx/Diagnosis


Provider Diagnosis: 


 Tonsillitis








Discharge





- Sign-Out/Discharge


Documenting (check all that apply): Patient Departure


All imaging exams completed and their final reports reviewed: No Studies





- Discharge Plan


Condition: Stable


Disposition: HOME


Prescriptions: 


Amoxicillin PO (*) [Amoxicillin 500 MG CAP*] 500 mg PO Q12H #20 cap


Fluconazole [Diflucan 150 MG (NF)] 150 mg PO ONCE PRN #1 tab


 PRN Reason: vaginal yeast infection


predniSONE TAB* [Deltasone TAB*] 50 mg PO DAILY #5 tab


Patient Education Materials:  Tonsillitis (ED)


Forms:  *Work Release


Referrals: 


Eunice Simon [Primary Care Provider] - 


Junaid Messina MD [Medical Doctor] - 


Additional Instructions: 


- Okay to alternate ibuprofen (Advil, Motrin) 600mg and Tylenol 1000mg every 3 

hours for pain. Take with food. Do NOT take for more than 4-5 days


- Okay to gargle and spit warm salt water every 4 hours as needed for pain


- Stay well hydrated - frequent sips of cold fluids will be soothing to your 

throat (popsicles, jello, ice cream, ice water). Avoid excess caffeine until 

your symptoms have resolved. 


-Throat infections are spread by oral secretions - do not share eating or 

drinking utensils until you symptoms are resolved.  Clean items that may get 

your secretions such as cell phones, ipads, computer mouse, television remotes. 

Once you have been on antibiotics for 2 days, change your toothbrush and your 

pillowcase. 


- use inhaler - 2puffs every 4 hours today, then every 4 hours as needed.  


- take prednisone and antibiotics as prescribed\


 - humidify the air in the room where you sleep - boil water, run a hot steam 

shower, vaporizer, cups of water by heat register


- Contact Dr. Messina to schedule a follow-up appointment


- you have been given a prescription for Diflucan - okay to take if you develop 

an antibiotics related yeast infection








- Billing Disposition and Condition


Condition: STABLE


Disposition: Home

## 2019-12-02 ENCOUNTER — HOSPITAL ENCOUNTER (EMERGENCY)
Dept: HOSPITAL 25 - UCCORT | Age: 24
Discharge: HOME | End: 2019-12-02
Payer: COMMERCIAL

## 2019-12-02 VITALS — SYSTOLIC BLOOD PRESSURE: 107 MMHG | DIASTOLIC BLOOD PRESSURE: 72 MMHG

## 2019-12-02 DIAGNOSIS — R31.9: ICD-10-CM

## 2019-12-02 DIAGNOSIS — R10.9: ICD-10-CM

## 2019-12-02 DIAGNOSIS — Z87.891: ICD-10-CM

## 2019-12-02 DIAGNOSIS — R30.0: Primary | ICD-10-CM

## 2019-12-02 DIAGNOSIS — R35.0: ICD-10-CM

## 2019-12-02 DIAGNOSIS — Z91.018: ICD-10-CM

## 2019-12-02 PROCEDURE — G0463 HOSPITAL OUTPT CLINIC VISIT: HCPCS

## 2019-12-02 PROCEDURE — 99211 OFF/OP EST MAY X REQ PHY/QHP: CPT

## 2019-12-02 PROCEDURE — 87086 URINE CULTURE/COLONY COUNT: CPT

## 2019-12-02 PROCEDURE — 84702 CHORIONIC GONADOTROPIN TEST: CPT

## 2019-12-02 PROCEDURE — 81003 URINALYSIS AUTO W/O SCOPE: CPT

## 2019-12-02 NOTE — UC
Complaint Female HPI





- HPI Summary


HPI Summary: 





Urinary frequency and irritation x3 days. Denies lower back pain. 





- History Of Current Complaint


Chief Complaint: UCGU


Stated Complaint: URINARY


Time Seen by Provider: 12/02/19 12:35


Hx Obtained From: Patient


Hx Last Menstrual Period: was on depo shots


Pregnant?: No


Onset/Duration: Sudden Onset


Severity Initially: Mild


Severity Currently: Mild


Pain Intensity: 0





- Allergies/Home Medications


Allergies/Adverse Reactions: 


 Allergies











Allergy/AdvReac Type Severity Reaction Status Date / Time


 


White Grape Juice Allergy  Swelling Uncoded 12/02/19 12:34











Home Medications: 


 Home Medications





NK [No Home Medications Reported]  12/02/19 [History Confirmed 12/02/19]











PMH/Surg Hx/FS Hx/Imm Hx


Previously Healthy: Yes





- Surgical History


Surgical History: Yes


Surgery Procedure, Year, and Place: tosillectomy





- Family History


Known Family History: Positive: Hypertension





- Social History


Alcohol Use: Occasionally


Substance Use Type: None


Smoking Status (MU): Former Smoker


Type: Cigarettes


Amount Used/How Often: 1/2 PPD


Length of Time of Smoking/Using Tobacco: Since Age 14


Have You Smoked in the Last Year: Yes


When Did the Patient Quit Smoking/Using Tobacco: 6/2018


Household Exposure Type: Cigarettes





- Immunization History


Most Recent Influenza Vaccination: Not the 2016/2017 Season


Most Recent Tetanus Shot: 158LBS





Review of Systems


All Other Systems Reviewed And Are Negative: Yes


Gastrointestinal: Positive: Abdominal Pain


Genitourinary: Positive: Dysuria, Hematuria


Is Patient Immunocompromised?: No





Physical Exam


Triage Information Reviewed: Yes


Appearance: Well-Appearing, No Pain Distress, Pain Distress


Vital Signs: 


 Initial Vital Signs











Temp  98.2 F   12/02/19 12:34


 


Pulse  97   12/02/19 12:34


 


Resp  16   12/02/19 12:34


 


BP  107/72   12/02/19 12:34


 


Pulse Ox  98   12/02/19 12:34











Vital Signs Reviewed: Yes


Eye Exam: Normal


ENT Exam: Normal


Dental Exam: Normal


Neck exam: Normal


Respiratory Exam: Normal


Cardiovascular Exam: Normal


Abdominal Exam: Normal


Bowel Sounds: Positive: Present


Musculoskeletal Exam: Normal


Neurological Exam: Normal


Psychological Exam: Normal


Skin Exam: Normal





 Complaint Female Dx





- Course


Course Of Treatment: 





hx obtained, exam performed ,meds reviewed, ua obtained, urine pregnancy 

obtained due to unprotected sex.





- Differential Dx/Diagnosis


Differential Diagnosis/HQI/PQRI: Urinary Tract Infection


Provider Diagnosis: 


 Dysuria








Discharge ED





- Sign-Out/Discharge


Documenting (check all that apply): Patient Departure


All imaging exams completed and their final reports reviewed: No Studies





- Discharge Plan


Condition: Stable


Disposition: HOME


Patient Education Materials:  Dysuria (ED)


Referrals: 


Eunice Simon [Primary Care Provider] - 


Additional Instructions: 


1. take the medication as prescribed.


2. Urine culture will be performed and if antibiotic is needed we will inform 

you.


3. take the cranberry pills and increase fluid intake.


4. Follow up if symtpoms worsen








- Billing Disposition and Condition


Condition: STABLE


Disposition: Home